# Patient Record
Sex: FEMALE | Race: WHITE | NOT HISPANIC OR LATINO | URBAN - METROPOLITAN AREA
[De-identification: names, ages, dates, MRNs, and addresses within clinical notes are randomized per-mention and may not be internally consistent; named-entity substitution may affect disease eponyms.]

---

## 2017-11-21 ENCOUNTER — APPOINTMENT (RX ONLY)
Dept: URBAN - METROPOLITAN AREA CLINIC 37 | Facility: CLINIC | Age: 22
Setting detail: DERMATOLOGY
End: 2017-11-21

## 2017-11-21 DIAGNOSIS — D22 MELANOCYTIC NEVI: ICD-10-CM

## 2017-11-21 DIAGNOSIS — L81.4 OTHER MELANIN HYPERPIGMENTATION: ICD-10-CM

## 2017-11-21 DIAGNOSIS — L663 OTHER SPECIFIED DISEASES OF HAIR AND HAIR FOLLICLES: ICD-10-CM

## 2017-11-21 DIAGNOSIS — L21.8 OTHER SEBORRHEIC DERMATITIS: ICD-10-CM

## 2017-11-21 DIAGNOSIS — L73.9 FOLLICULAR DISORDER, UNSPECIFIED: ICD-10-CM

## 2017-11-21 DIAGNOSIS — L81.3 CAFÉ AU LAIT SPOTS: ICD-10-CM

## 2017-11-21 DIAGNOSIS — L70.0 ACNE VULGARIS: ICD-10-CM

## 2017-11-21 DIAGNOSIS — L738 OTHER SPECIFIED DISEASES OF HAIR AND HAIR FOLLICLES: ICD-10-CM

## 2017-11-21 PROBLEM — L02.426 FURUNCLE OF LEFT LOWER LIMB: Status: ACTIVE | Noted: 2017-11-21

## 2017-11-21 PROBLEM — D22.72 MELANOCYTIC NEVI OF LEFT LOWER LIMB, INCLUDING HIP: Status: ACTIVE | Noted: 2017-11-21

## 2017-11-21 PROBLEM — D22.71 MELANOCYTIC NEVI OF RIGHT LOWER LIMB, INCLUDING HIP: Status: ACTIVE | Noted: 2017-11-21

## 2017-11-21 PROBLEM — L85.3 XEROSIS CUTIS: Status: ACTIVE | Noted: 2017-11-21

## 2017-11-21 PROBLEM — L55.1 SUNBURN OF SECOND DEGREE: Status: ACTIVE | Noted: 2017-11-21

## 2017-11-21 PROCEDURE — 99203 OFFICE O/P NEW LOW 30 MIN: CPT

## 2017-11-21 PROCEDURE — ? COUNSELING

## 2017-11-21 PROCEDURE — ? ADDITIONAL NOTES

## 2017-11-21 ASSESSMENT — LOCATION SIMPLE DESCRIPTION DERM
LOCATION SIMPLE: CHEST
LOCATION SIMPLE: ABDOMEN
LOCATION SIMPLE: LEFT UPPER BACK
LOCATION SIMPLE: RIGHT PRETIBIAL REGION
LOCATION SIMPLE: RIGHT THIGH
LOCATION SIMPLE: LEFT THIGH
LOCATION SIMPLE: RIGHT FOREARM
LOCATION SIMPLE: LEFT CHEEK
LOCATION SIMPLE: TRAPEZIAL NECK
LOCATION SIMPLE: LEFT PRETIBIAL REGION
LOCATION SIMPLE: LEFT FOREARM
LOCATION SIMPLE: SCALP
LOCATION SIMPLE: RIGHT FOREHEAD

## 2017-11-21 ASSESSMENT — LOCATION DETAILED DESCRIPTION DERM
LOCATION DETAILED: STERNAL NOTCH
LOCATION DETAILED: EPIGASTRIC SKIN
LOCATION DETAILED: LEFT ANTERIOR DISTAL THIGH
LOCATION DETAILED: LEFT PROXIMAL PRETIBIAL REGION
LOCATION DETAILED: RIGHT ANTERIOR DISTAL THIGH
LOCATION DETAILED: LEFT VENTRAL PROXIMAL FOREARM
LOCATION DETAILED: MID TRAPEZIAL NECK
LOCATION DETAILED: RIGHT INFERIOR MEDIAL FOREHEAD
LOCATION DETAILED: RIGHT PROXIMAL PRETIBIAL REGION
LOCATION DETAILED: LEFT MID-UPPER BACK
LOCATION DETAILED: LEFT INFERIOR CENTRAL MALAR CHEEK
LOCATION DETAILED: RIGHT SUPERIOR PARIETAL SCALP
LOCATION DETAILED: RIGHT VENTRAL PROXIMAL FOREARM

## 2017-11-21 ASSESSMENT — LOCATION ZONE DERM
LOCATION ZONE: TRUNK
LOCATION ZONE: SCALP
LOCATION ZONE: FACE
LOCATION ZONE: ARM
LOCATION ZONE: NECK
LOCATION ZONE: LEG

## 2017-11-21 NOTE — PROCEDURE: ADDITIONAL NOTES
Additional Notes: Pt may use OTC hydrocortisone ointment BID to the affected area as needed for up to 14 days.  SE discussed.  Pt will call if no resolution.

## 2019-01-08 ENCOUNTER — APPOINTMENT (RX ONLY)
Dept: URBAN - METROPOLITAN AREA CLINIC 37 | Facility: CLINIC | Age: 24
Setting detail: DERMATOLOGY
End: 2019-01-08

## 2019-01-08 DIAGNOSIS — L30.9 DERMATITIS, UNSPECIFIED: ICD-10-CM

## 2019-01-08 PROCEDURE — ? ADDITIONAL NOTES

## 2019-01-08 PROCEDURE — ? PRESCRIPTION

## 2019-01-08 PROCEDURE — 99213 OFFICE O/P EST LOW 20 MIN: CPT

## 2019-01-08 PROCEDURE — ? TREATMENT REGIMEN

## 2019-01-08 PROCEDURE — ? COUNSELING

## 2019-01-08 RX ORDER — TRIAMCINOLONE ACETONIDE 1 MG/G
THIN LAYER CREAM TOPICAL TWICE DAILY
Qty: 1 | Refills: 1 | Status: ERX | COMMUNITY
Start: 2019-01-08

## 2019-01-08 RX ADMIN — TRIAMCINOLONE ACETONIDE THIN LAYER: 1 CREAM TOPICAL at 00:00

## 2019-01-08 ASSESSMENT — LOCATION ZONE DERM: LOCATION ZONE: ARM

## 2019-01-08 ASSESSMENT — LOCATION DETAILED DESCRIPTION DERM: LOCATION DETAILED: LEFT VENTRAL DISTAL FOREARM

## 2019-01-08 ASSESSMENT — LOCATION SIMPLE DESCRIPTION DERM: LOCATION SIMPLE: LEFT FOREARM

## 2019-01-08 NOTE — HPI: RASH
What Type Of Note Output Would You Prefer (Optional)?: Bullet Format
How Severe Is Your Rash?: mild
Is This A New Presentation, Or A Follow-Up?: Rash
Additional History: Pt. states she’s had this rash coming and going since the week of thanksgiving. She states it’s only there for a day then goes away. This recent flare up started Saturday and is still there, states it hurts to even use water on the area.

## 2019-01-08 NOTE — PROCEDURE: TREATMENT REGIMEN
Plan: Zyrtec 24 hour dose, take one morning and night.\\nTriamcinolone bid for two weeks per month.\\nPt. advised to start a skin diet.
Detail Level: Zone

## 2019-01-08 NOTE — PROCEDURE: ADDITIONAL NOTES
Additional Notes: DDx includes contact dermatitis (urticaria?) vs drug eruption.  She did have some itching in her throat which was transient; denied any lip/tongue swelling, difficulty talking, difficulty swallowing, etc.\\n\\nPt. states she has itching all over however the rash is only on her left wrist area.\\nPt. works at a office desk.\\nShe states the first flare was in [place] couple days before thanksgiving.\\nPt. states she has two dogs.\\nPt. states she takes name brand BC pill no substitute \\nPt. takes Ibuprofen every now and then.\\nAdvised pt. this is either happening d/2 her coming in contact with something or ingesting something.\\nAdvised if she feels mouth tingling or symptoms she can’t breathe go to ER.\\n\\nRTC 4 weeks
Detail Level: Simple

## 2019-02-07 ENCOUNTER — APPOINTMENT (RX ONLY)
Dept: URBAN - METROPOLITAN AREA CLINIC 45 | Facility: CLINIC | Age: 24
Setting detail: DERMATOLOGY
End: 2019-02-07

## 2019-02-07 DIAGNOSIS — L30.9 DERMATITIS, UNSPECIFIED: ICD-10-CM | Status: RESOLVED

## 2019-02-07 PROCEDURE — 99212 OFFICE O/P EST SF 10 MIN: CPT

## 2019-02-07 PROCEDURE — ? COUNSELING

## 2019-02-07 PROCEDURE — ? ADDITIONAL NOTES

## 2019-02-07 ASSESSMENT — LOCATION ZONE DERM: LOCATION ZONE: ARM

## 2019-02-07 ASSESSMENT — LOCATION DETAILED DESCRIPTION DERM: LOCATION DETAILED: LEFT VENTRAL DISTAL FOREARM

## 2019-02-07 ASSESSMENT — LOCATION SIMPLE DESCRIPTION DERM: LOCATION SIMPLE: LEFT FOREARM

## 2019-02-07 NOTE — PROCEDURE: MIPS QUALITY
Quality 226: Preventive Care And Screening: Tobacco Use: Screening And Cessation Intervention: Patient screened for tobacco use and is an ex/non-smoker
Quality 110: Preventive Care And Screening: Influenza Immunization: Influenza Immunization Administered during Influenza season
Quality 130: Documentation Of Current Medications In The Medical Record: Current Medications Documented
Detail Level: Detailed
Quality 431: Preventive Care And Screening: Unhealthy Alcohol Use - Screening: Patient screened for unhealthy alcohol use using a single question and scores less than 2 times per year

## 2019-02-07 NOTE — PROCEDURE: ADDITIONAL NOTES
Detail Level: Simple
Additional Notes: This has been a transient fine papular pruritic eruption on the L volar forearm since Nov 2018.  It has flared up 3 or 4 times, in different environments (vacation, work, and home).  \\n\\nFavor contact irritant/allergy causing a hypersensitive urticarial reaction.  Possibly detergent/dryer sheets etc?\\n\\nSince clear today and responsive to TAC cream when necessary, will continue that management and simply observe.\\n\\nRTC prn.

## 2019-03-07 ENCOUNTER — RX ONLY (OUTPATIENT)
Age: 24
Setting detail: RX ONLY
End: 2019-03-07

## 2019-03-07 RX ORDER — TRIAMCINOLONE ACETONIDE 1 MG/G
THIN LAYER CREAM TOPICAL TWICE DAILY
Qty: 1 | Refills: 2 | Status: ERX

## 2019-04-09 ENCOUNTER — APPOINTMENT (RX ONLY)
Dept: URBAN - METROPOLITAN AREA CLINIC 37 | Facility: CLINIC | Age: 24
Setting detail: DERMATOLOGY
End: 2019-04-09

## 2019-04-09 DIAGNOSIS — L30.9 DERMATITIS, UNSPECIFIED: ICD-10-CM

## 2019-04-09 DIAGNOSIS — L53.8 OTHER SPECIFIED ERYTHEMATOUS CONDITIONS: ICD-10-CM

## 2019-04-09 PROCEDURE — ? COUNSELING

## 2019-04-09 PROCEDURE — 99213 OFFICE O/P EST LOW 20 MIN: CPT

## 2019-04-09 PROCEDURE — ? ADDITIONAL NOTES

## 2019-04-09 ASSESSMENT — LOCATION SIMPLE DESCRIPTION DERM
LOCATION SIMPLE: LEFT FOREARM
LOCATION SIMPLE: LEFT THIGH

## 2019-04-09 ASSESSMENT — LOCATION DETAILED DESCRIPTION DERM
LOCATION DETAILED: LEFT VENTRAL DISTAL FOREARM
LOCATION DETAILED: LEFT ANTERIOR PROXIMAL THIGH

## 2019-04-09 ASSESSMENT — LOCATION ZONE DERM
LOCATION ZONE: LEG
LOCATION ZONE: ARM

## 2019-04-09 NOTE — HPI: RASH
What Type Of Note Output Would You Prefer (Optional)?: Standard Output
How Severe Is Your Rash?: mild
Is This A New Presentation, Or A Follow-Up?: Follow Up Rash
Additional History: Patient presents today for recurring rash that is noted clear today but she states this is the 3rd breakout that appeared last Thursday. Patient states when it appears it is red, itchy and has spread from her upper extremities, chest, back and back of the neck up to the back of the scalp. She states the rash will last up to 3 days. She states this past recurrence was the worse of all the others she has had. She has been treated with Triamcinolone cream bid and states initially it is effective but then it wears off.

## 2019-05-02 ENCOUNTER — APPOINTMENT (RX ONLY)
Dept: URBAN - METROPOLITAN AREA CLINIC 45 | Facility: CLINIC | Age: 24
Setting detail: DERMATOLOGY
End: 2019-05-02

## 2019-05-02 DIAGNOSIS — L30.9 DERMATITIS, UNSPECIFIED: ICD-10-CM

## 2019-05-02 PROCEDURE — ? ADDITIONAL NOTES

## 2019-05-02 PROCEDURE — ? BIOPSY BY PUNCH METHOD

## 2019-05-02 PROCEDURE — ? COUNSELING

## 2019-05-02 PROCEDURE — 11104 PUNCH BX SKIN SINGLE LESION: CPT

## 2019-05-02 ASSESSMENT — LOCATION ZONE DERM: LOCATION ZONE: ARM

## 2019-05-02 ASSESSMENT — LOCATION SIMPLE DESCRIPTION DERM: LOCATION SIMPLE: LEFT FOREARM

## 2019-05-02 ASSESSMENT — LOCATION DETAILED DESCRIPTION DERM: LOCATION DETAILED: LEFT VENTRAL DISTAL FOREARM

## 2019-05-02 NOTE — PROCEDURE: ADDITIONAL NOTES
Additional Notes: This has been a transient fine papular pruritic eruption on the L volar forearm since Nov 2018.  Rash lasts maybe 2 to 4 days at a time.  This time she woke up yesterday AM with the rash.  Temporal relationships with any activity have been cryptic, although this time she did tell me she ate sushi the night before the rash appeared; this was crab sushi.  She says she has had that before without reaction, though.\\n\\nNo relationship to menstrual cycle. It has flared up maybe 10-12 times, in different environments (vacation, work, and home).  Always started on L forearm, then goes to R forearm then may generalize.  May be very itchy.  Seems to be getting worse with flares over time.  No help with ZYRTEC every AM.  TAC cream may help or it may go away on its own.  Doing SKIN DIET.\\n\\nPMH:  Takes no supplements, vitamins.  Only very unoccasional IBUPROFEN for headaches.  Been on SPRINTEC for years.  \\n\\nSocH:  has 2 dogs at home.  Works at [employer].  Likes chocolate, but doesn't think it is related.\\n\\nFavor internal ingestion vs contact irritant/allergy causing a hypersensitive urticarial reaction.  Possibly detergent/dryer sheets etc?\\n\\nMay cont TAC cream prn.\\nCont ZYRTEC every AM.\\nCont SKIN DIET.\\n\\nConsider food journal x 2 weeks, starring when she gets a flare.\\n\\nRTC 2w for S/R and further management.
Detail Level: Simple

## 2019-05-02 NOTE — PROCEDURE: BIOPSY BY PUNCH METHOD
Anesthesia Volume In Cc: 3.5
Bill For Surgical Tray: no
X Depth Of Punch In Cm (Optional): 0
Post-Care Instructions: I reviewed with the patient in detail post-care instructions. Patient is to keep the biopsy site dry overnight, and then apply bacitracin twice daily until healed. Patient may apply hydrogen peroxide soaks to remove any crusting.
Punch Size In Mm: 5
Detail Level: Simple
Wound Care: Petrolatum
Consent: Written consent was obtained and risks were reviewed including but not limited to scarring, infection, bleeding, scabbing, incomplete removal, nerve damage and allergy to anesthesia.
Was A Bandage Applied: Yes
Lab: [id number]
Biopsy Type: H and E
Notification Instructions: Patient will be notified of biopsy results. However, patient instructed to call the office if not contacted within 2 weeks.
Epidermal Sutures: 4-0 Ethilon
Home Suture Removal Text: Patient was provided a home suture removal kit and will remove their sutures at home.  If they have any questions or difficulties they will call the office.
Hemostasis: None
Additional Anesthesia Volume In Cc (Will Not Render If 0): 2
Billing Type: Third-Party Bill
Anesthesia Type: 1% lidocaine with epinephrine
Dressing: bandage
Lab Facility: [id number]
Suture Removal: 14 days

## 2019-05-16 ENCOUNTER — APPOINTMENT (RX ONLY)
Dept: URBAN - METROPOLITAN AREA CLINIC 45 | Facility: CLINIC | Age: 24
Setting detail: DERMATOLOGY
End: 2019-05-16

## 2019-05-16 DIAGNOSIS — L20.89 OTHER ATOPIC DERMATITIS: ICD-10-CM

## 2019-05-16 DIAGNOSIS — Z48.02 ENCOUNTER FOR REMOVAL OF SUTURES: ICD-10-CM

## 2019-05-16 PROBLEM — L20.84 INTRINSIC (ALLERGIC) ECZEMA: Status: ACTIVE | Noted: 2019-05-16

## 2019-05-16 PROCEDURE — ? COUNSELING

## 2019-05-16 PROCEDURE — ? SUTURE REMOVAL (NO GLOBAL PERIOD)

## 2019-05-16 PROCEDURE — ? ADDITIONAL NOTES

## 2019-05-16 PROCEDURE — 99213 OFFICE O/P EST LOW 20 MIN: CPT

## 2019-05-16 ASSESSMENT — LOCATION DETAILED DESCRIPTION DERM
LOCATION DETAILED: RIGHT VENTRAL DISTAL FOREARM
LOCATION DETAILED: LEFT VENTRAL DISTAL FOREARM
LOCATION DETAILED: LEFT VENTRAL PROXIMAL FOREARM

## 2019-05-16 ASSESSMENT — LOCATION ZONE DERM: LOCATION ZONE: ARM

## 2019-05-16 ASSESSMENT — LOCATION SIMPLE DESCRIPTION DERM
LOCATION SIMPLE: RIGHT FOREARM
LOCATION SIMPLE: LEFT FOREARM

## 2019-05-16 ASSESSMENT — SEVERITY ASSESSMENT: SEVERITY: CLEAR

## 2019-05-16 NOTE — PROCEDURE: ADDITIONAL NOTES
Additional Notes: This has been a transient fine papular pruritic eruption on the L volar forearm since Nov 2018. Rash lasts maybe 2 to 4 days at a time. Temporal relationships with any activity have been cryptic, although it might relate to eating sushi or wearing a certain watch?\\n\\nHPI:  No relationship to menstrual cycle. It has flared up maybe 10-12 times, in different environments (vacation, work, and home). Always started on L forearm, then goes to R forearm then may generalize. May be very itchy. Seems to be getting worse with flares over time. No help with ZYRTEC every AM. TAC cream may help or it may go away on its own. Doing SKIN DIET.\\n\\nPMH: Takes no supplements, vitamins. Only very unoccasional IBUPROFEN for headaches. Been on SPRINTEC for years. \\n\\nSocH: has 2 dogs at home. Works at [employer] Likes chocolate, but doesn't think it is related.\\n\\nPunch biopsy showed follicular spongiotic dermatitis, which I discussed in detail.\\n\\nFavor irritant/allergic contact dermatitis (unknown contactant) vs less likely ingestion-related dermatitis vs id reaction (2/2 unknown trigger?).\\n\\nMay cont TAC cream prn.\\nCont ZYRTEC every AM.\\nCont SKIN DIET.\\n\\nConsider food journal x 2 weeks, starting when she gets a flare.\\n\\nConsider PATCH TESTING, which I discussed in detail.\\n\\nRTC prn PATCH TESTING or flare of rash.
Detail Level: Detailed

## 2019-06-07 ENCOUNTER — APPOINTMENT (RX ONLY)
Dept: URBAN - METROPOLITAN AREA CLINIC 37 | Facility: CLINIC | Age: 24
Setting detail: DERMATOLOGY
End: 2019-06-07

## 2019-06-10 ENCOUNTER — APPOINTMENT (RX ONLY)
Dept: URBAN - METROPOLITAN AREA CLINIC 45 | Facility: CLINIC | Age: 24
Setting detail: DERMATOLOGY
End: 2019-06-10

## 2019-06-10 DIAGNOSIS — L20.89 OTHER ATOPIC DERMATITIS: ICD-10-CM

## 2019-06-10 PROBLEM — L20.84 INTRINSIC (ALLERGIC) ECZEMA: Status: ACTIVE | Noted: 2019-06-10

## 2019-06-10 PROCEDURE — ? PATCH TESTING

## 2019-06-10 PROCEDURE — 95044 PATCH/APPLICATION TESTS: CPT

## 2019-06-10 PROCEDURE — ? ADDITIONAL NOTES

## 2019-06-12 ENCOUNTER — APPOINTMENT (RX ONLY)
Dept: URBAN - METROPOLITAN AREA CLINIC 45 | Facility: CLINIC | Age: 24
Setting detail: DERMATOLOGY
End: 2019-06-12

## 2019-06-12 DIAGNOSIS — L20.89 OTHER ATOPIC DERMATITIS: ICD-10-CM

## 2019-06-12 PROBLEM — L20.84 INTRINSIC (ALLERGIC) ECZEMA: Status: ACTIVE | Noted: 2019-06-12

## 2019-06-12 PROCEDURE — ? TRUE TEST READING

## 2019-06-12 PROCEDURE — 99024 POSTOP FOLLOW-UP VISIT: CPT

## 2019-06-12 PROCEDURE — ? ADDITIONAL NOTES

## 2019-06-12 PROCEDURE — ? COUNSELING

## 2019-06-12 NOTE — PROCEDURE: TRUE TEST READING
Show Negative Results In The Note?: Yes
30 - Budesonide: no reaction
Show Allergen Counseling In The Note?: No
36 - 2-Bromo-2-Nitropropane-1,3-Diol (Bronopol): +/-
Detail Level: Zone
What Reading Time Point?: 48 hour
Number Of Patches Read: 36

## 2019-06-14 ENCOUNTER — APPOINTMENT (RX ONLY)
Dept: URBAN - METROPOLITAN AREA CLINIC 45 | Facility: CLINIC | Age: 24
Setting detail: DERMATOLOGY
End: 2019-06-14

## 2019-06-14 DIAGNOSIS — L20.89 OTHER ATOPIC DERMATITIS: ICD-10-CM

## 2019-06-14 PROBLEM — L20.84 INTRINSIC (ALLERGIC) ECZEMA: Status: ACTIVE | Noted: 2019-06-14

## 2019-06-14 PROCEDURE — ? TRUE TEST READING

## 2019-06-14 PROCEDURE — ? COUNSELING

## 2019-06-14 PROCEDURE — 99024 POSTOP FOLLOW-UP VISIT: CPT

## 2019-06-14 PROCEDURE — ? ADDITIONAL NOTES

## 2019-06-14 NOTE — PROCEDURE: MIPS QUALITY
Quality 130: Documentation Of Current Medications In The Medical Record: Current Medications Documented
Quality 431: Preventive Care And Screening: Unhealthy Alcohol Use - Screening: Patient screened for unhealthy alcohol use using a single question and scores less than 2 times per year
Quality 226: Preventive Care And Screening: Tobacco Use: Screening And Cessation Intervention: Patient screened for tobacco use and is an ex/non-smoker
Detail Level: Detailed
Quality 110: Preventive Care And Screening: Influenza Immunization: Influenza Immunization Administered during Influenza season

## 2019-06-14 NOTE — PROCEDURE: TRUE TEST READING
Show Negative Results In The Note?: Yes
23 - Thimerosal: no reaction
Show Allergen Counseling In The Note?: No
1 - Nickel Sulfate: 1+
Detail Level: Zone
27 - Tixocortol-21-Pivalate: +/-
What Reading Time Point?: 48 hour
Number Of Patches Read: 36

## 2019-06-14 NOTE — PROCEDURE: ADDITIONAL NOTES
Additional Notes: Discussed positivity of nickel and bronopol and explained where these contactants may be found.  Gave TRUE test printouts.  Rec SKIN SAFE silvia for guidance, but patient will have to read many product labels.  Rec trial of no jewelry.\\n\\nRTC prn.
Detail Level: Detailed

## 2020-09-04 ENCOUNTER — APPOINTMENT (RX ONLY)
Dept: URBAN - METROPOLITAN AREA CLINIC 45 | Facility: CLINIC | Age: 25
Setting detail: DERMATOLOGY
End: 2020-09-04

## 2020-09-04 DIAGNOSIS — D485 NEOPLASM OF UNCERTAIN BEHAVIOR OF SKIN: ICD-10-CM

## 2020-09-04 DIAGNOSIS — L81.4 OTHER MELANIN HYPERPIGMENTATION: ICD-10-CM

## 2020-09-04 DIAGNOSIS — Q82.5 CONGENITAL NON-NEOPLASTIC NEVUS: ICD-10-CM

## 2020-09-04 DIAGNOSIS — L20.89 OTHER ATOPIC DERMATITIS: ICD-10-CM | Status: IMPROVED

## 2020-09-04 PROBLEM — L20.84 INTRINSIC (ALLERGIC) ECZEMA: Status: ACTIVE | Noted: 2020-09-04

## 2020-09-04 PROBLEM — D48.5 NEOPLASM OF UNCERTAIN BEHAVIOR OF SKIN: Status: ACTIVE | Noted: 2020-09-04

## 2020-09-04 PROCEDURE — ? DEFER

## 2020-09-04 PROCEDURE — ? COUNSELING

## 2020-09-04 PROCEDURE — ? SUNSCREEN RECOMMENDATIONS

## 2020-09-04 PROCEDURE — ? ADDITIONAL NOTES

## 2020-09-04 PROCEDURE — 99214 OFFICE O/P EST MOD 30 MIN: CPT

## 2020-09-04 ASSESSMENT — LOCATION SIMPLE DESCRIPTION DERM
LOCATION SIMPLE: ABDOMEN
LOCATION SIMPLE: UPPER BACK
LOCATION SIMPLE: POSTERIOR NECK

## 2020-09-04 ASSESSMENT — LOCATION DETAILED DESCRIPTION DERM
LOCATION DETAILED: EPIGASTRIC SKIN
LOCATION DETAILED: INFERIOR THORACIC SPINE
LOCATION DETAILED: LEFT POSTERIOR NECK

## 2020-09-04 ASSESSMENT — LOCATION ZONE DERM
LOCATION ZONE: NECK
LOCATION ZONE: TRUNK

## 2020-09-04 NOTE — HPI: SKIN LESION
What Type Of Note Output Would You Prefer (Optional)?: Standard Output
How Severe Is Your Skin Lesion?: mild
Has Your Skin Lesion Been Treated?: not been treated
Is This A New Presentation, Or A Follow-Up?: Skin Lesions
Additional History: Patient presents for a FBSE with no Hx. She does have a mole on her right lower leg that she thinks is getting bigger.

## 2020-09-04 NOTE — PROCEDURE: ADDITIONAL NOTES
Additional Notes: She has been avoiding nickel and the rash has decreased in frequency
Detail Level: Detailed
Additional Notes: R lat knee area pink papule, recently growing.  I rec biopsy to r/o atypia but patient declined today.  She says she will sched in near future.

## 2020-11-20 NOTE — HPI: TESTING (PATCH TESTING READING, DISCUSSION OF RESULTS)
Thank you for trusting us with your care. We aim to provide you with the best possible experience during your time at our clinic. Please remember that we will call you with your test results within 5-7 business days of your test, unless stated otherwise.     Please call me with any questions or concerns regarding your care. Stay well.    Sincerely,    SARAVANAN Nair, RAUL  [facility]  [phone]    
How Severe Is Your Rash?: moderate
What Patch Testing Have You Undergone?: TRUE test
What Reading Is This?: 72 hour patch test reading

## 2021-05-12 NOTE — PROCEDURE: ADDITIONAL NOTES
· Stable sacral wound noted upon exam   No erythema, drainage -local care and off weighting
Detail Level: Simple
Additional Notes: This has been a transient fine papular pruritic eruption on the L volar forearm since Nov 2018.  Rash lasts maybe 2 to 4 days at a time.  No relationship to menstrual cycle. It has flared up maybe 10-12 times, in different environments (vacation, work, and home).  Always started on L forearm, then goes to R forearm then may generalize.  May be very itchy.  Seems to be getting worse with flares over time.  No help with ZYRTEC every AM.  TAC cream may help or it may go away on its own.  Doing SKIN DIET.\\n\\nPMH:  Takes no supplements, vitamins.  Only very unoccasional IBUPROFEN for headaches.  Been on SPRINTEC for years.  \\n\\nSocH:  has 2 dogs at home.  Works at [employer].  Likes chocolate, but doesn't think it is related.\\n\\nFavor contact irritant/allergy causing a hypersensitive urticarial reaction.  Possibly detergent/dryer sheets etc?\\n\\nMay cont TAC cream prn.\\nCont ZYRTEC every AM.\\nCont SKIN DIET.\\n\\nConsider food journal x 2 weeks, starring when she gets a flare.\\n\\nRTC 1 month or sooner for same-day biopsy of rash.\\n\\n--\\nPatient presents today for recurring rash that is noted clear today. Patient states when it appears it is red, itchy and has spread from her upper left extremity to the right extremity, chest, back and posterior neck up to the back of the scalp. She states the rash will last up to 3 days. She states this past recurrence was the worse of all the others she has had. She has been treated with Triamcinolone cream bid and states initially it is effective but then it wears off.\\n\\nPatient Advised to keep a food diary of things she consume throughout the day considering this could be a possible allergen. Also, we discussed if she is experiencing a rash she can call to get a same day evaluation so a possible biopsy can be obtained to determine the source. Otherwise, she will f/u in one month. Patient agrees and states understanding.
Detail Level: Detailed
Additional Notes: This is a slowly enlarging erythematous patch on the L anterior thigh x 3w.  Started small and getting larger.  Asymptomatic.  No induration today.  No scaling for KOH.\\n\\nIs this related?  DDx includes very subtle EAC.

## 2021-10-02 NOTE — PROCEDURE: MIPS QUALITY
Quality 431: Preventive Care And Screening: Unhealthy Alcohol Use - Screening: Patient screened for unhealthy alcohol use using a single question and scores less than 2 times per year
Quality 130: Documentation Of Current Medications In The Medical Record: Current Medications Documented
Quality 226: Preventive Care And Screening: Tobacco Use: Screening And Cessation Intervention: Patient screened for tobacco use and is an ex/non-smoker
Detail Level: Detailed
English